# Patient Record
Sex: MALE | Race: WHITE | Employment: FULL TIME | URBAN - METROPOLITAN AREA
[De-identification: names, ages, dates, MRNs, and addresses within clinical notes are randomized per-mention and may not be internally consistent; named-entity substitution may affect disease eponyms.]

---

## 2024-01-15 ENCOUNTER — OFFICE VISIT (OUTPATIENT)
Dept: ORTHOPEDIC SURGERY | Age: 34
End: 2024-01-15
Payer: COMMERCIAL

## 2024-01-15 VITALS — WEIGHT: 193 LBS | HEIGHT: 71 IN | BODY MASS INDEX: 27.02 KG/M2

## 2024-01-15 DIAGNOSIS — M25.512 ACUTE PAIN OF LEFT SHOULDER: Primary | ICD-10-CM

## 2024-01-15 PROCEDURE — G8427 DOCREV CUR MEDS BY ELIG CLIN: HCPCS | Performed by: ORTHOPAEDIC SURGERY

## 2024-01-15 PROCEDURE — 99203 OFFICE O/P NEW LOW 30 MIN: CPT | Performed by: ORTHOPAEDIC SURGERY

## 2024-01-15 PROCEDURE — G8484 FLU IMMUNIZE NO ADMIN: HCPCS | Performed by: ORTHOPAEDIC SURGERY

## 2024-01-15 PROCEDURE — G8419 CALC BMI OUT NRM PARAM NOF/U: HCPCS | Performed by: ORTHOPAEDIC SURGERY

## 2024-01-15 PROCEDURE — 1036F TOBACCO NON-USER: CPT | Performed by: ORTHOPAEDIC SURGERY

## 2024-01-15 RX ORDER — IBUPROFEN 200 MG
200 TABLET ORAL EVERY 6 HOURS PRN
COMMUNITY

## 2024-01-15 RX ORDER — CYCLOBENZAPRINE HCL 5 MG
5 TABLET ORAL 3 TIMES DAILY PRN
COMMUNITY

## 2024-01-15 SDOH — HEALTH STABILITY: PHYSICAL HEALTH: ON AVERAGE, HOW MANY DAYS PER WEEK DO YOU ENGAGE IN MODERATE TO STRENUOUS EXERCISE (LIKE A BRISK WALK)?: 5 DAYS

## 2024-01-15 SDOH — HEALTH STABILITY: PHYSICAL HEALTH: ON AVERAGE, HOW MANY MINUTES DO YOU ENGAGE IN EXERCISE AT THIS LEVEL?: 60 MIN

## 2024-01-15 NOTE — PROGRESS NOTES
CHIEF COMPLAINT: Left shoulder pain    DATE OF INJURY: 1/4/24    History:    Jonel Hendricks is a 33 y.o. right handed male self-referred for evaluation and treatment of Left shoulder pain. This is evaluated as a personal injury. The pain began 11 days ago.  Pain is rated as a 4/10.   There was an injury.  He was skin pain and had an unexpected fall.  He states that he thinks his arm got caught in went into almost MASON position.  He states he had stiffness and decreased range of motion afterwards.  A few days later he states he was try to move his arm around and felt a pop and then it started feeling and moving better.  He points to pain being located at his AC joint, anteriorly, and along his latissimus.  Symptoms are worse with putting his arm in a jacket, overhead activity, and laying on his side.  The patient has not had PT. The patient has not had an injection. The patient has tried NSAIDs, without relief. The patient has tried ice, without relief. Patient's occupation is       No past medical history on file.    No past surgical history on file.    No current outpatient medications on file prior to visit.     No current facility-administered medications on file prior to visit.       Not on File    Social History     Socioeconomic History    Marital status:      Spouse name: Not on file    Number of children: Not on file    Years of education: Not on file    Highest education level: Not on file   Occupational History    Not on file   Tobacco Use    Smoking status: Not on file    Smokeless tobacco: Not on file   Substance and Sexual Activity    Alcohol use: Not on file    Drug use: Not on file    Sexual activity: Not on file   Other Topics Concern    Not on file   Social History Narrative    Not on file     Social Determinants of Health     Financial Resource Strain: Not on file   Food Insecurity: Not on file   Transportation Needs: Not on file   Physical Activity: Sufficiently Active

## 2024-01-15 NOTE — PROGRESS NOTES
[facility] New Patient Additional Questions       Question 1/15/2024  8:42 AM EST - Filed by Patient    Please list any providers you have seen in the last 12 months and for what reason       In the past 12 Months have you had any of the following symptoms?     Headaches No    Fainting or passing out No    Sudden loss of vision, strength, or inability to speak No    Hearing loss or ringing in ear(s) No    Nosebleeds No    Coughing for more than 2-4 weeks No    Coughing up blood No    Shortness of breath or wheezing No    Swelling of feet or ankles No    Chest pain, chest pressure or heaviness No    Irregular heartbeat or suden fast heartbeat No    Difficulty swallowing or food \"sticking\" No    Frequent heartburn or indigestion No    Abdominal pain No    Frequent constipation No    Frequent diarrhea No    Rectal bleeding/black stools No    Urinating more than twice per night No    Pain in joints or bones No    Unusual bruising or bleeding No    Seizures, convulsions No    Change in wart, mole or skin growth No    Difficulty sleeping No    Tearfulness No    Difficulty concentrating No    Weight loss more than 5-10 pounds No    Irregular menstrual bleeding No    Menstrual bleeding after menopause No    Breast lumps/discharge from nipple(s) No    On average, how many days per week do you engage in moderate to strenuous exercise (like a brisk walk)? 5 days    On average, how many minutes do you engage in exercise at this level? 60 min    Please upload an image of your Drivers License.  [Media Unavailable] Upload from 1/15/2024  8:42 AM EST    Which option best describes your E-cigarette/Vaping usage? Never Used

## 2024-01-25 NOTE — PLAN OF CARE
Extremity Functional Scale to assist with return to prior level of function.    Status: [] Progressing: [] Met: [] Not Met: [] Adjusted  Improve ROM to WNL to allow for proper joint functioning as indicated by patients functional deficits.  Status: [] Progressing: [] Met: [] Not Met: [] Adjusted  Pt to improve strength to 4+/5 or better of scapular retractors and shoulder elevators to allow for proper muscle and joint use in functional mobility, ADLs and prior level of function   Status: [] Progressing: [] Met: [] Not Met: [] Adjusted  Patient will return to Dressing, Lifting, and working out without increased symptoms or restriction to work towards return to prior level of function.       Status: [] Progressing: [] Met: [] Not Met: [] Adjusted  Patient will resume normal work/leisure activities without pain.            Status: [] Progressing: [] Met: [] Not Met: [] Adjusted    TREATMENT PLAN     Frequency/Duration: 2x/week for 4-6 weeks for the following treatment interventions:    Interventions:  [x] Therapeutic exercise including: strength training, ROM, including postural re-education.   [x] NMR activation and proprioception, including postural re-education.    [x] Manual therapy as indicated to include: PROM, Gr I-IV mobilizations, and STM  [x] Modalities as needed that may include: Cryotherapy  [x] Patient education on joint protection, postural re-education, activity modification, progression of HEP.        [] Aquatic Therapy     HEP instruction: Refer to HEP instructions outlined on the flowsheet on 01/26/2024.    Electronically Signed by Trina Benavides, PT  Date: 01/26/2024

## 2024-01-26 ENCOUNTER — HOSPITAL ENCOUNTER (OUTPATIENT)
Dept: PHYSICAL THERAPY | Age: 34
Setting detail: THERAPIES SERIES
Discharge: HOME | End: 2024-01-26
Attending: ORTHOPAEDIC SURGERY
Payer: COMMERCIAL

## 2024-01-26 DIAGNOSIS — R26.89 DECREASED FUNCTIONAL MOBILITY: ICD-10-CM

## 2024-01-26 DIAGNOSIS — R52 PAIN: Primary | ICD-10-CM

## 2024-01-26 PROCEDURE — 97161 PT EVAL LOW COMPLEX 20 MIN: CPT

## 2024-01-26 PROCEDURE — 97110 THERAPEUTIC EXERCISES: CPT

## 2024-01-26 PROCEDURE — 97530 THERAPEUTIC ACTIVITIES: CPT

## 2024-01-26 NOTE — FLOWSHEET NOTE
SHOULDER Flexion (180) 150  4            Abduction (180) 95  4            ER -0    4+            ER -90 (90) 71  4            IR -0   4+            IR -90 (70) 25  4            Therapeutic Ex (12098)  resistance Sets/time Reps Notes/Cues/Progressions          UBE  retro              Ball on wall  CW/CCW  Up and down  Side to side    30 sec each              Tband  Extension  Flexion  IR  ER   green    10  10  10  10           serratus   10    Plyoball - abigail circles                                   Manual Intervention (63908)  TIME     Quadruped              Rhythmic stab at 45/90/120                     NMR re-education (16960) resistance Sets/time Reps CUES NEEDED                                      Therapeutic Activity (40961)  Sets/time     Pt was educated on diagnosis, anatomy. pathology,and prognosis. Time was  taken to answer all patient questions and to clarify particiaption in PT.. Appointment policy, pt schedule discussed, with pt who expressed understanding.         Review of self care and adl modifications to avoid pain - dressing, sleeping addressed - pt expresses understanding.                              Modalities:    No modalities applied this session    Education/Home Exercise Program: HEP discussed and performed, see exercise grid      ASSESSMENT     Today's Assessment: See Eval    Medical Necessity Documentation:  I certify that this patient meets the below criteria necessary for medical necessity for care and/or justification of therapy services:  The patient has functional impairments and/or activity limitations and would benefit from continued outpatient therapy services to address the deficits outlined in the patients goals  The patient has a musculoskeletal condition(s) with a corresponding ICD-10 code that is of complexity and severity that require skilled therapeutic intervention. This has a direct and significant impact on the need for therapy and significantly impacts

## 2024-02-02 ENCOUNTER — HOSPITAL ENCOUNTER (OUTPATIENT)
Dept: PHYSICAL THERAPY | Age: 34
Setting detail: THERAPIES SERIES
Discharge: HOME | End: 2024-02-02
Attending: ORTHOPAEDIC SURGERY
Payer: COMMERCIAL

## 2024-02-02 PROCEDURE — 97110 THERAPEUTIC EXERCISES: CPT

## 2024-02-02 NOTE — FLOWSHEET NOTE
[facility]- Outpatient Rehabilitation and Therapy [address] [place] [address] office: [phone] fax: [phone]      Physical Therapy: TREATMENT/PROGRESS NOTE   Patient: Jonel Hendricks (33 y.o. male)   Treatment Date: 2024   :  1990 MRN: [id number]   Visit #: 2   Insurance Allowable Auth Needed   BOMN []Yes    [x]No    Insurance: Payor: [employer] / Plan: [facility] CHOICE PLUS / Product Type: *No Product type* /   Insurance ID: [id number] - (Commercial)  Secondary Insurance (if applicable):    Treatment Diagnosis:     ICD-10-CM    1. Pain  R52       2. Decreased functional mobility  R26.89          Medical Diagnosis:    Acute pain of left shoulder [M25.512]   Referring Physician: Juan Barron MD  PCP: No primary care provider on file.                             Plan of care signed:       Date of Patient follow up with Physician:      Progress Report/POC: EVAL today  POC update due: (10 visits /OR AUTH LIMITS, whichever is less)  3/1/2024      (Cut and paste Precautions/Contraindications from Eval) none noted    Preferred Language for Healthcare:   [x]English       []other:    SUBJECTIVE EXAMINATION      Test used Initial score  2024   Pain Summary VAS 2-6    Functional questionnaire Upper Extremity functional Scale     Other:              MD noted  24 -He was skiing and had an unexpected fall.  He states that he thinks his arm got caught in went into almost MASON position.  He states he had stiffness and decreased range of motion afterwards.  A few days later he states he was try to move his arm around and felt a pop and then it started feeling and moving better.  Imaging   AC Joint: Mild narrowing Glenohumeral joint: no abnormalities noted.  No Hill-Sachs deformity. Elevation humeral head: absent     - Symptoms are worse with putting his arm in a jacket, overhead activity, and laying on his side.      OBJECTIVE EXAMINATION

## 2024-02-09 ENCOUNTER — HOSPITAL ENCOUNTER (OUTPATIENT)
Dept: PHYSICAL THERAPY | Age: 34
Setting detail: THERAPIES SERIES
Discharge: HOME | End: 2024-02-09
Attending: ORTHOPAEDIC SURGERY
Payer: COMMERCIAL

## 2024-02-09 PROCEDURE — 97110 THERAPEUTIC EXERCISES: CPT

## 2024-02-09 NOTE — FLOWSHEET NOTE
up visits, this note will serve as a discharge from care along with the most recent update on progress.

## 2024-02-16 ENCOUNTER — HOSPITAL ENCOUNTER (OUTPATIENT)
Dept: PHYSICAL THERAPY | Age: 34
Setting detail: THERAPIES SERIES
Discharge: HOME | End: 2024-02-16
Attending: ORTHOPAEDIC SURGERY
Payer: COMMERCIAL

## 2024-02-16 PROCEDURE — 97112 NEUROMUSCULAR REEDUCATION: CPT

## 2024-02-16 PROCEDURE — 97110 THERAPEUTIC EXERCISES: CPT

## 2024-02-16 NOTE — FLOWSHEET NOTE
skilled physical therapy and decision making.     Medical Necessity Documentation:  I certify that this patient meets the below criteria necessary for medical necessity for care and/or justification of therapy services:  The patient has functional impairments and/or activity limitations and would benefit from continued outpatient therapy services to address the deficits outlined in the patients goals  The patient has a musculoskeletal condition(s) with a corresponding ICD-10 code that is of complexity and severity that require skilled therapeutic intervention. This has a direct and significant impact on the need for therapy and significantly impacts the rate of recovery.     Treatment/Activity Tolerance:  [x] Patient tolerated treatment well [] Patient limited by fatique  [] Patient limited by pain  [] Patient limited by other medical complications  [] Other:     Return to Play: NA    Prognosis for POC: [x] Good [] Fair  [] Poor    Patient requires continued skilled intervention: [x] Yes  [] No    GOALS     Patient stated goal: eliminate pain, get back to working out  Status: [] Progressing: [] Met: [] Not Met: [] Adjusted     Therapist goals for Patient:   Short Term Goals: To be achieved in: 2 weeks  Independent in HEP and progression per patient tolerance, in order to progress toward full function and prevent re-injury.               Status: [] Progressing: [] Met: [] Not Met: [] Adjusted  Patient will have a decrease in pain to 0-1/10 to help  facilitate improvement in movement, function, and ADLs as indicated by functional deficits.              Status: [] Progressing: [] Met: [] Not Met: [] Adjusted     Long Term Goals: To be achieved in: 8weeks  Disability index score of 10% or less for the Upper Extremity Functional Scale to assist with return to prior level of function.                      Status: [] Progressing: [] Met: [] Not Met: [] Adjusted  Improve ROM to WNL to allow for proper joint functioning as

## 2024-02-23 ENCOUNTER — HOSPITAL ENCOUNTER (OUTPATIENT)
Dept: PHYSICAL THERAPY | Age: 34
Setting detail: THERAPIES SERIES
Discharge: HOME | End: 2024-02-23
Attending: ORTHOPAEDIC SURGERY
Payer: COMMERCIAL

## 2024-02-23 PROCEDURE — 97112 NEUROMUSCULAR REEDUCATION: CPT

## 2024-02-23 PROCEDURE — 97110 THERAPEUTIC EXERCISES: CPT

## 2024-02-23 NOTE — FLOWSHEET NOTE
CHARGE GRID   CPT Code (TIMED) minutes # CPT Code (UNTIMED) #     Therex (19071)  30 2  EVAL:LOW (56396 - Typically 20 minutes face-to-face)     Neuromusc. Re-ed (83913) 10 1  Re-Eval (31158)     Manual (10394)    Estim Unattended (29849)     Ther. Act (56715)    Mech. Traction (16037)     Gait (24658)    Dry Needle 1-2 muscle (56011)     Aquatic Therex (98718)    Dry Needle 3+ muscle (49140)     Iontophoresis (45502)    VASO (00676)     Ultrasound (80863)    Group Therapy (07002)     Estim Attended (84471)    Canalith Repositioning (22847)     Other:    Other:    Total Timed Code Tx Minutes 40 3       Total Treatment Minutes 40        Charge Justification:  (45859) THERAPEUTIC EXERCISE - Provided verbal/tactile cueing for activities related to strengthening, flexibility, endurance, ROM performed to prevent loss of range of motion, maintain or improve muscular strength or increase flexibility, following either an injury or surgery.   (86402) NEUROMUSCULAR RE-EDUCATION - Therapeutic procedure, 1 or more areas, each 15 minutes; neuromuscular reeducation of movement, balance, coordination, kinesthetic sense, posture, and/or proprioception for sitting and/or standing activities    TREATMENT PLAN   Plan: Cont POC- Continue emphasis/focus on exercise progression, improving proper muscle recruitment and activation/motor control patterns, increasing ROM, allowing for proper ROM, and kinesthetic sense and proprioception. Next visit plan to progress weights, progress reps, and add new exercises  working toward pt goals.    Electronically Signed by Trina Benavides PT , DPT   Date: 02/23/2024     Note: If patient does not return for scheduled/recommended follow up visits, this note will serve as a discharge from care along with the most recent update on progress.

## 2024-02-26 ENCOUNTER — TELEPHONE (OUTPATIENT)
Dept: ORTHOPEDIC SURGERY | Age: 34
End: 2024-02-26

## 2024-02-26 DIAGNOSIS — M25.512 ACUTE PAIN OF LEFT SHOULDER: Primary | ICD-10-CM

## 2024-02-26 NOTE — TELEPHONE ENCOUNTER
Jonel scheduled MyChart appointment documenting interest in pursuing imaging due to continued shoulder pain.   MRI order placed.

## 2024-02-26 NOTE — TELEPHONE ENCOUNTER
L/BREANNE FOR Jonel Hendricks  regarding MRI LT SHOULDER approval and authorization being valid until 5/26/2024.  The MRI was requested per his Strolby appointment note indicating he wishes to proceed    Patient was instructed that their MRI needs to be scheduled at [facility].    The patient was instructed to contact the facility to schedule  at [phone].    As long as his MRI is completed on or before 3/15/2024, MRI results can be reviewed during his 2-3  3/19/2024 appointment.     Strolby message sent to Jonel as well.

## 2024-02-29 ENCOUNTER — HOSPITAL ENCOUNTER (OUTPATIENT)
Dept: PHYSICAL THERAPY | Age: 34
Setting detail: THERAPIES SERIES
Discharge: HOME | End: 2024-02-29
Attending: ORTHOPAEDIC SURGERY
Payer: COMMERCIAL

## 2024-02-29 PROCEDURE — 97110 THERAPEUTIC EXERCISES: CPT

## 2024-02-29 NOTE — FLOWSHEET NOTE
(77653)  20 1  EVAL:LOW (97909 - Typically 20 minutes face-to-face)     Neuromusc. Re-ed (64712)    Re-Eval (73047)     Manual (39065)    Estim Unattended (62180)     Ther. Act (81905)    Mech. Traction (05406)     Gait (75026)    Dry Needle 1-2 muscle (67591)     Aquatic Therex (51351)    Dry Needle 3+ muscle (20561)     Iontophoresis (90601)    VASO (89728)     Ultrasound (09212)    Group Therapy (73521)     Estim Attended (53856)    Canalith Repositioning (69483)     Other:    Other:    Total Timed Code Tx Minutes 20 1       Total Treatment Minutes 20        Charge Justification:  (68609) THERAPEUTIC EXERCISE - Provided verbal/tactile cueing for activities related to strengthening, flexibility, endurance, ROM performed to prevent loss of range of motion, maintain or improve muscular strength or increase flexibility, following either an injury or surgery.   (86127) NEUROMUSCULAR RE-EDUCATION - Therapeutic procedure, 1 or more areas, each 15 minutes; neuromuscular reeducation of movement, balance, coordination, kinesthetic sense, posture, and/or proprioception for sitting and/or standing activities    TREATMENT PLAN   Plan: Hold pending MD visit     Electronically Signed by Trina Benavides PT , DPT   Date: 02/29/2024     Note: If patient does not return for scheduled/recommended follow up visits, this note will serve as a discharge from care along with the most recent update on progress.

## 2024-03-01 ENCOUNTER — APPOINTMENT (OUTPATIENT)
Dept: PHYSICAL THERAPY | Age: 34
End: 2024-03-01
Attending: ORTHOPAEDIC SURGERY
Payer: COMMERCIAL

## 2024-03-25 ENCOUNTER — OFFICE VISIT (OUTPATIENT)
Dept: ORTHOPEDIC SURGERY | Age: 34
End: 2024-03-25
Payer: COMMERCIAL

## 2024-03-25 VITALS — BODY MASS INDEX: 26.88 KG/M2 | HEIGHT: 71 IN | RESPIRATION RATE: 16 BRPM | WEIGHT: 192 LBS

## 2024-03-25 DIAGNOSIS — M25.512 ACUTE PAIN OF LEFT SHOULDER: ICD-10-CM

## 2024-03-25 DIAGNOSIS — S43.432A TEAR OF LEFT GLENOID LABRUM, INITIAL ENCOUNTER: Primary | ICD-10-CM

## 2024-03-25 DIAGNOSIS — M75.22 TENDINITIS OF LONG HEAD OF BICEPS BRACHII OF LEFT SHOULDER: ICD-10-CM

## 2024-03-25 PROCEDURE — 99213 OFFICE O/P EST LOW 20 MIN: CPT | Performed by: ORTHOPAEDIC SURGERY

## 2024-03-25 NOTE — PROGRESS NOTES
status:      Spouse name: Not on file    Number of children: Not on file    Years of education: Not on file    Highest education level: Not on file   Occupational History    Occupation:    Tobacco Use    Smoking status: Former     Types: Cigarettes    Smokeless tobacco: Never   Vaping Use    Vaping Use: Never used   Substance and Sexual Activity    Alcohol use: Yes     Alcohol/week: 15.0 standard drinks of alcohol     Types: 15 Shots of liquor per week    Drug use: Not Currently     Types: Marijuana (Weed)    Sexual activity: Yes     Partners: Female   Other Topics Concern    Not on file   Social History Narrative    Not on file     Social Determinants of Health     Financial Resource Strain: Not on file   Food Insecurity: Not on file   Transportation Needs: Not on file   Physical Activity: Sufficiently Active (1/15/2024)    Exercise Vital Sign     Days of Exercise per Week: 5 days     Minutes of Exercise per Session: 60 min   Stress: Not on file   Social Connections: Not on file   Intimate Partner Violence: Not on file   Housing Stability: Not on file       No family history on file.        Physical Examination:      Vital signs:  Resp 16   Ht 1.803 m (5' 11\")   Wt 87.1 kg (192 lb)   BMI 26.78 kg/m²     General:   alert, appears stated age, cooperative, and no distress   Left Shoulder   Active ROM:   forward flexion 180, external rotation 80, internal rotation L4.    Bilateral shoulders   Joint Tenderness:   none   Neer:  Negative   Vera:  Positive anteriorly   Strength:   5/5 Supraspinatus, External rotation    Bilateral shoulders   Drop-arm test:   negative   Belly-press test:   negative   Bear-hug test:   negative   Speed's test:  Negative   Bicipital groove tenderness: Positive   Gomez's test:  Negative   Negative apprehension test.  Negative dynamic labral shear test      Imaging   Left Shoulder X-Ray: 3 views obtained and reviewed  AC Joint: Mild narrowing  Glenohumeral joint: no

## 2024-03-26 NOTE — FLOWSHEET NOTE
[facility]- Outpatient Rehabilitation and Therapy [address] [place] [address] office: [phone] fax: [phone]      Physical Therapy: TREATMENT/PROGRESS NOTE   Patient: Jonel Hendricks (33 y.o. male)   Treatment Date: 2024   :  1990 MRN: [id number]   Visit #: 7   Insurance Allowable Auth Needed   BOMN []Yes    [x]No    Insurance: Payor: [employer] / Plan: [facility] CHOICE PLUS / Product Type: *No Product type* /   Insurance ID: [id number] - (Commercial)  Secondary Insurance (if applicable):    Treatment Diagnosis:     ICD-10-CM    1. Pain  R52       2. Decreased functional mobility  R26.89          Medical Diagnosis:    Acute pain of left shoulder [M25.512]   Referring Physician: Juan Barron MD  PCP: No primary care provider on file.                             Plan of care signed:  YES    Date of Patient follow up with Physician:      Progress Report/POC: NO  POC update due: (10 visits /OR AUTH LIMITS, whichever is less)  2024      (Cut and paste Precautions/Contraindications from Eval) none noted    Preferred Language for Healthcare:   [x]English       []other:    SUBJECTIVE EXAMINATION      Test used Initial score  2024   Pain Summary VAS 2-6 2/10   Functional questionnaire Upper Extremity functional Scale     Other:              MD noted  24 -He was skiing and had an unexpected fall.  He states that he thinks his arm got caught in went into almost MASON position.  He states he had stiffness and decreased range of motion afterwards.  A few days later he states he was try to move his arm around and felt a pop and then it started feeling and moving better.  Imaging   AC Joint: Mild narrowing Glenohumeral joint: no abnormalities noted.  No Hill-Sachs deformity. Elevation humeral head: absent     - Symptoms are worse with putting his arm in a jacket, overhead activity, and laying on his side.  24: Pt reports reaching across the

## 2024-03-28 ENCOUNTER — HOSPITAL ENCOUNTER (OUTPATIENT)
Dept: PHYSICAL THERAPY | Age: 34
Setting detail: THERAPIES SERIES
Discharge: HOME | End: 2024-03-28
Attending: ORTHOPAEDIC SURGERY
Payer: COMMERCIAL

## 2024-03-28 PROCEDURE — 97110 THERAPEUTIC EXERCISES: CPT

## 2024-04-23 ENCOUNTER — HOSPITAL ENCOUNTER (OUTPATIENT)
Dept: PHYSICAL THERAPY | Age: 34
Setting detail: THERAPIES SERIES
Discharge: HOME | End: 2024-04-23
Attending: ORTHOPAEDIC SURGERY
Payer: COMMERCIAL

## 2024-04-23 PROCEDURE — 97110 THERAPEUTIC EXERCISES: CPT

## 2024-04-23 NOTE — FLOWSHEET NOTE
[facility]- Outpatient Rehabilitation and Therapy [address] [place] [address] office: [phone] fax: [phone]      Physical Therapy: TREATMENT/PROGRESS NOTE   Patient: Jonel Hendricks (33 y.o. male)   Treatment Date: 2024   :  1990 MRN: [id number]   Visit #: 8   Insurance Allowable Auth Needed   BOMN []Yes    [x]No    Insurance: Payor: [employer] / Plan: [facility] CHOICE PLUS / Product Type: *No Product type* /   Insurance ID: [id number] - (Commercial)  Secondary Insurance (if applicable):    Treatment Diagnosis:     ICD-10-CM    1. Pain  R52       2. Decreased functional mobility  R26.89          Medical Diagnosis:    Acute pain of left shoulder [M25.512]   Referring Physician: Juan Barron MD  PCP: No primary care provider on file.                             Plan of care signed:  YES    Date of Patient follow up with Physician:      Progress Report/POC: NO  POC update due: (10 visits /OR AUTH LIMITS, whichever is less)  2024      (Cut and paste Precautions/Contraindications from Eval) none noted    Preferred Language for Healthcare:   [x]English       []other:    SUBJECTIVE EXAMINATION      Test used Initial score  2024   Pain Summary VAS 2-6 2/10   Functional questionnaire Upper Extremity functional Scale     Other:              MD noted  24 -He was skiing and had an unexpected fall.  He states that he thinks his arm got caught in went into almost MASON position.  He states he had stiffness and decreased range of motion afterwards.  A few days later he states he was try to move his arm around and felt a pop and then it started feeling and moving better.  Imaging   AC Joint: Mild narrowing Glenohumeral joint: no abnormalities noted.  No Hill-Sachs deformity. Elevation humeral head: absent     - Symptoms are worse with putting his arm in a jacket, overhead activity, and laying on his side.  24: Pt reports reaching across the